# Patient Record
Sex: MALE | Race: NATIVE HAWAIIAN OR OTHER PACIFIC ISLANDER | NOT HISPANIC OR LATINO | URBAN - METROPOLITAN AREA
[De-identification: names, ages, dates, MRNs, and addresses within clinical notes are randomized per-mention and may not be internally consistent; named-entity substitution may affect disease eponyms.]

---

## 2017-06-19 ENCOUNTER — EMERGENCY (EMERGENCY)
Facility: HOSPITAL | Age: 27
LOS: 1 days | Discharge: HOME | End: 2017-06-19
Admitting: EMERGENCY MEDICINE
Payer: MEDICAID

## 2017-06-19 VITALS
SYSTOLIC BLOOD PRESSURE: 125 MMHG | RESPIRATION RATE: 18 BRPM | DIASTOLIC BLOOD PRESSURE: 78 MMHG | TEMPERATURE: 98 F | HEART RATE: 90 BPM | WEIGHT: 163.8 LBS | OXYGEN SATURATION: 97 %

## 2017-06-19 DIAGNOSIS — F17.200 NICOTINE DEPENDENCE, UNSPECIFIED, UNCOMPLICATED: ICD-10-CM

## 2017-06-19 DIAGNOSIS — Z79.899 OTHER LONG TERM (CURRENT) DRUG THERAPY: ICD-10-CM

## 2017-06-19 DIAGNOSIS — R09.81 NASAL CONGESTION: ICD-10-CM

## 2017-06-19 DIAGNOSIS — R10.9 UNSPECIFIED ABDOMINAL PAIN: ICD-10-CM

## 2017-06-19 PROCEDURE — 93010 ELECTROCARDIOGRAM REPORT: CPT

## 2017-06-19 PROCEDURE — 99283 EMERGENCY DEPT VISIT LOW MDM: CPT | Mod: 25

## 2017-06-19 PROCEDURE — 93005 ELECTROCARDIOGRAM TRACING: CPT

## 2017-06-19 RX ORDER — OMEPRAZOLE 10 MG/1
1 CAPSULE, DELAYED RELEASE ORAL
Qty: 0 | Refills: 0 | COMMUNITY

## 2017-06-19 RX ORDER — MOMETASONE FUROATE 50 UG/1
2 SPRAY NASAL
Qty: 1 | Refills: 0 | OUTPATIENT
Start: 2017-06-19 | End: 2017-06-26

## 2017-06-19 NOTE — ED PROVIDER NOTE - OBJECTIVE STATEMENT
The pt is a 25 y/o M, who presents to ED c/o nasal congestion and trouble breathing through nose x few mon.  States congestion is worse at night time.  Hx of GERD on omeprazole, had endoscopy that showed gastritis. Denies fevers, chills, cp, sob, n/v/d

## 2017-06-19 NOTE — ED PROVIDER NOTE - MEDICAL DECISION MAKING DETAILS
pt w/sinus - nasal congestion w/ post nasal drip, no fevers/chills or acute sinusitis, will tx w/nasal spray, f/u w/ent, advised to stop smoking, no gi c/o at this time and has gi f/u in place

## 2017-06-19 NOTE — ED ADULT TRIAGE NOTE - CHIEF COMPLAINT QUOTE
patient complains of midsternal chest pain and throat pain radiating to abdomen for the last few years. he states that he has been diagnosed with GERD no medications now.

## 2017-06-19 NOTE — ED PROVIDER NOTE - ENMT, MLM
Airway patent, + nasal congestion, + post nasal drip, no frontal or maxillary sinus tend b/l . Mouth with normal mucosa. Throat has no vesicles, no oropharyngeal exudates and uvula is midline.

## 2017-06-19 NOTE — ED ADULT NURSE NOTE - OBJECTIVE STATEMENT
presented to ED with pain from throat down to abdomen more than 1 year. seen by several doctors; Dx GERD.  weight lost 8 pounds in 1 month

## 2019-10-29 ENCOUNTER — EMERGENCY (EMERGENCY)
Facility: HOSPITAL | Age: 29
LOS: 1 days | Discharge: HOME | End: 2019-10-29
Attending: EMERGENCY MEDICINE | Admitting: EMERGENCY MEDICINE
Payer: MEDICAID

## 2019-10-29 VITALS
HEART RATE: 73 BPM | TEMPERATURE: 98 F | OXYGEN SATURATION: 97 % | DIASTOLIC BLOOD PRESSURE: 67 MMHG | RESPIRATION RATE: 16 BRPM | SYSTOLIC BLOOD PRESSURE: 149 MMHG

## 2019-10-29 VITALS
DIASTOLIC BLOOD PRESSURE: 80 MMHG | HEART RATE: 90 BPM | TEMPERATURE: 98 F | SYSTOLIC BLOOD PRESSURE: 159 MMHG | RESPIRATION RATE: 16 BRPM | OXYGEN SATURATION: 98 %

## 2019-10-29 LAB
ALBUMIN SERPL ELPH-MCNC: 4.6 G/DL — SIGNIFICANT CHANGE UP (ref 3.3–5)
ALP SERPL-CCNC: 86 U/L — SIGNIFICANT CHANGE UP (ref 40–120)
ALT FLD-CCNC: 22 U/L — SIGNIFICANT CHANGE UP (ref 4–41)
ANION GAP SERPL CALC-SCNC: 11 MMO/L — SIGNIFICANT CHANGE UP (ref 7–14)
AST SERPL-CCNC: 20 U/L — SIGNIFICANT CHANGE UP (ref 4–40)
BASOPHILS # BLD AUTO: 0.04 K/UL — SIGNIFICANT CHANGE UP (ref 0–0.2)
BASOPHILS NFR BLD AUTO: 0.4 % — SIGNIFICANT CHANGE UP (ref 0–2)
BILIRUB SERPL-MCNC: 0.5 MG/DL — SIGNIFICANT CHANGE UP (ref 0.2–1.2)
BUN SERPL-MCNC: 8 MG/DL — SIGNIFICANT CHANGE UP (ref 7–23)
CALCIUM SERPL-MCNC: 9.4 MG/DL — SIGNIFICANT CHANGE UP (ref 8.4–10.5)
CHLORIDE SERPL-SCNC: 101 MMOL/L — SIGNIFICANT CHANGE UP (ref 98–107)
CO2 SERPL-SCNC: 29 MMOL/L — SIGNIFICANT CHANGE UP (ref 22–31)
CREAT SERPL-MCNC: 1.12 MG/DL — SIGNIFICANT CHANGE UP (ref 0.5–1.3)
EOSINOPHIL # BLD AUTO: 0.08 K/UL — SIGNIFICANT CHANGE UP (ref 0–0.5)
EOSINOPHIL NFR BLD AUTO: 0.9 % — SIGNIFICANT CHANGE UP (ref 0–6)
GLUCOSE SERPL-MCNC: 86 MG/DL — SIGNIFICANT CHANGE UP (ref 70–99)
HCT VFR BLD CALC: 42.6 % — SIGNIFICANT CHANGE UP (ref 39–50)
HGB BLD-MCNC: 14.8 G/DL — SIGNIFICANT CHANGE UP (ref 13–17)
IMM GRANULOCYTES NFR BLD AUTO: 0.2 % — SIGNIFICANT CHANGE UP (ref 0–1.5)
LYMPHOCYTES # BLD AUTO: 2.64 K/UL — SIGNIFICANT CHANGE UP (ref 1–3.3)
LYMPHOCYTES # BLD AUTO: 29.5 % — SIGNIFICANT CHANGE UP (ref 13–44)
MCHC RBC-ENTMCNC: 30.1 PG — SIGNIFICANT CHANGE UP (ref 27–34)
MCHC RBC-ENTMCNC: 34.7 % — SIGNIFICANT CHANGE UP (ref 32–36)
MCV RBC AUTO: 86.8 FL — SIGNIFICANT CHANGE UP (ref 80–100)
MONOCYTES # BLD AUTO: 0.76 K/UL — SIGNIFICANT CHANGE UP (ref 0–0.9)
MONOCYTES NFR BLD AUTO: 8.5 % — SIGNIFICANT CHANGE UP (ref 2–14)
NEUTROPHILS # BLD AUTO: 5.4 K/UL — SIGNIFICANT CHANGE UP (ref 1.8–7.4)
NEUTROPHILS NFR BLD AUTO: 60.5 % — SIGNIFICANT CHANGE UP (ref 43–77)
NRBC # FLD: 0 K/UL — SIGNIFICANT CHANGE UP (ref 0–0)
PLATELET # BLD AUTO: 223 K/UL — SIGNIFICANT CHANGE UP (ref 150–400)
PMV BLD: 11.5 FL — SIGNIFICANT CHANGE UP (ref 7–13)
POTASSIUM SERPL-MCNC: 3.7 MMOL/L — SIGNIFICANT CHANGE UP (ref 3.5–5.3)
POTASSIUM SERPL-SCNC: 3.7 MMOL/L — SIGNIFICANT CHANGE UP (ref 3.5–5.3)
PROT SERPL-MCNC: 7.7 G/DL — SIGNIFICANT CHANGE UP (ref 6–8.3)
RBC # BLD: 4.91 M/UL — SIGNIFICANT CHANGE UP (ref 4.2–5.8)
RBC # FLD: 11.9 % — SIGNIFICANT CHANGE UP (ref 10.3–14.5)
SODIUM SERPL-SCNC: 141 MMOL/L — SIGNIFICANT CHANGE UP (ref 135–145)
TROPONIN T, HIGH SENSITIVITY: < 6 NG/L — SIGNIFICANT CHANGE UP (ref ?–14)
WBC # BLD: 8.94 K/UL — SIGNIFICANT CHANGE UP (ref 3.8–10.5)
WBC # FLD AUTO: 8.94 K/UL — SIGNIFICANT CHANGE UP (ref 3.8–10.5)

## 2019-10-29 PROCEDURE — 99284 EMERGENCY DEPT VISIT MOD MDM: CPT

## 2019-10-29 PROCEDURE — 71045 X-RAY EXAM CHEST 1 VIEW: CPT | Mod: 26

## 2019-10-29 RX ORDER — IBUPROFEN 200 MG
400 TABLET ORAL ONCE
Refills: 0 | Status: COMPLETED | OUTPATIENT
Start: 2019-10-29 | End: 2019-10-29

## 2019-10-29 RX ORDER — ACETAMINOPHEN 500 MG
975 TABLET ORAL ONCE
Refills: 0 | Status: COMPLETED | OUTPATIENT
Start: 2019-10-29 | End: 2019-10-29

## 2019-10-29 RX ADMIN — Medication 975 MILLIGRAM(S): at 22:55

## 2019-10-29 RX ADMIN — Medication 400 MILLIGRAM(S): at 22:55

## 2019-10-29 NOTE — ED PROVIDER NOTE - PHYSICAL EXAMINATION
PHYSICAL EXAM:    Constitutional: NAD. well-developed; well-groomed; well-nourished; young male lying comfortably on bed on room air.  HEENT: AT/NC, PERRLA; EOMI, MMM, no cervical lymphadenopathy, supple neck.  Respiratory: CTAB. equal aeration bilaterally. no wheezing, no crackes, no rhonchi. no increase in WOB  Cardiovascular: RRR, no M/R/G. 2+ distal pulses. Cap refill < 2 seconds. no JVD  Gastrointestinal: soft; ND, TTP on lower ribs and RUQ/LUQ. +BS, no peritoneal signs.   Extremities: no clubbing; no cyanosis; no pedal edema, non-tender to palpation, DP and Radial pulses intact.  Skin: warm and dry; color normal: no rash: no ulcers  Neurological: A&Ox 3; responds to pain; responds to verbal commands; epicritic and protopathic sensation intact: CN nerves grossly intact.   MSK/Back: no deformities. Active and passive ROM intact; strength intact, no CVA tenderness, No joint tenderness, swelling, erythema  Psychiatric: normal mood/affect.

## 2019-10-29 NOTE — ED PROVIDER NOTE - PATIENT PORTAL LINK FT
You can access the FollowMyHealth Patient Portal offered by [facility] by registering at the following website: [email or web address]. By joining [facility]’s FollowMyHealth portal, you will also be able to view your health information using other applications (apps) compatible with our system.

## 2019-10-29 NOTE — ED PROVIDER NOTE - NS ED ROS FT
CONSTITUTIONAL: No fever, weight loss, or fatigue  EYES: No eye pain, visual disturbances, or discharge  ENMT:  No difficulty hearing, tinnitus, vertigo; No sinus or throat pain  RESPIRATORY: cough, SOB, no wheezing, chills or hemoptysis;  CARDIOVASCULAR: chest pain, no palpitations, dizziness, or leg swelling  GASTROINTESTINAL: abdominal pain. No nausea, vomiting, or hematemesis; No diarrhea or constipation. No melena or hematochezia.  GENITOURINARY: No dysuria, frequency, hematuria, or incontinence  NEUROLOGICAL: No headaches, loss of strength, numbness, or tremors  SKIN: No itching, burning, rashes, or lesions   LYMPH NODES: No enlarged glands  ENDOCRINE: No heat or cold intolerance; No polydipsia or polyuria  MUSCULOSKELETAL: No joint pain or swelling;   PSYCHIATRIC: Denies depression, anxiety  HEME/LYMPH: No easy bruising, or bleeding gums  ALLERGY AND IMMUNOLOGIC: No hives or eczema

## 2019-10-29 NOTE — ED PROVIDER NOTE - CLINICAL SUMMARY MEDICAL DECISION MAKING FREE TEXT BOX
Sasha: 28 M, no PMH, p/w CP (at lower ribs) and SOB after smoking tobacco and marijuana a lot the last 3 days. Cough. EKG unremarkable. Not likely PNA: no infectious Sx (eg, purulent cough). Not likely PE or other VTE: PERC or Wells low score, EKG no e/o R-heart strain. EKG: LVH. Check CXR for PTX.

## 2019-10-29 NOTE — ED ADULT TRIAGE NOTE - CHIEF COMPLAINT QUOTE
Pt comes in with c/o chest pain and SOB since last night, stating that he has been smoking marijuana/cigarettes in increased amount recently. Pt reporting now he is having discomfort in chest, feeling SOB and came in for evaluation for his symptoms. Pt appears in no acute distress, EKG completed and breathing unlabored in triage.

## 2019-10-29 NOTE — ED PROVIDER NOTE - ATTENDING CONTRIBUTION TO CARE
I performed a face-to-face evaluation of the patient and performed a history and physical examination. I agree with the history and physical examination.    28 M, no PMH, p/w CP (at lower ribs) and SOB after smoking tobacco and marijuana a lot the last 3 days. Cough. EKG unremarkable. Not likely PNA: no infectious Sx (eg, purulent cough). Not likely PE or other VTE: PERC or Wells low score, EKG no e/o R-heart strain. EKG: LVH. Check CXR for PTX.

## 2019-10-29 NOTE — ED ADULT NURSE NOTE - NSIMPLEMENTINTERV_GEN_ALL_ED
Implemented All Universal Safety Interventions:  [place] to call system. Call bell, personal items and telephone within reach. Instruct patient to call for assistance. Room bathroom lighting operational. Non-slip footwear when patient is off stretcher. Physically safe environment: no spills, clutter or unnecessary equipment. Stretcher in lowest position, wheels locked, appropriate side rails in place.

## 2019-10-29 NOTE — ED PROVIDER NOTE - OBJECTIVE STATEMENT
Mr. Mendenhall is a 27 y/o male with no significant PMH, who presented w/ CP and SOB in setting of THC use and smoking.    He has been using THC every other day for past three months without significant adverse reaction. Also for past three days, he had increased substance abuse with smoking cigarette with friends, yet was having incongruent thoughts, which made him to take care of himself. He had a lot of dry coughs, SOB especially due to lower rib pain that is preventing deep breath. Also was feeling like he is running out of breath as well.  He went back to home and slept, hoping his symptom will improve, yet he woke up with same chest pressure, and he decided to come to ED for check up.

## 2019-10-29 NOTE — ED ADULT NURSE NOTE - OBJECTIVE STATEMENT
pt received alert and oriented x3. pt c.o having midsternal chest pain and SOB  starting yesterday s/p smoking marijuana. respirations  equal and unlabored. ans soft and nontender. nsr on cm  . pt declines dizziness, lightheadedness, fevers,chills,n/v/d. Call bell in reach, warm blanket provided, bed in lowest position, side rails up x2,safety maintained. will continue to monitor. md at bedside for eval.

## 2019-10-29 NOTE — ED PROVIDER NOTE - NSFOLLOWUPINSTRUCTIONS_ED_ALL_ED_FT
You were evaluated for chest pain in the emergency room. Blood work was within normal limit. Electrocardiogram showed no acute ischemic changes. Cardiac enzyme was negative, less concerning for heart attack. CXR was clean w/o acute pathologic finding. Please follow up with primary care doctor. Avoid smoking cigarette or using recreational drug for now.

## 2019-10-30 PROBLEM — K21.9 GASTRO-ESOPHAGEAL REFLUX DISEASE WITHOUT ESOPHAGITIS: Chronic | Status: ACTIVE | Noted: 2017-06-19

## 2023-06-15 NOTE — ED ADULT TRIAGE NOTE - HEART RATE (BEATS/MIN)
"6/15/23 at 1633 - This Rn called [facility] again to inquire about a CASK test still pending from original testing.  Spoke with \"marcia\" again, she reports that she has not heard from \"curation\" yet on pending results,  she will reach out again to see if there is an estimate on when these results will come in.  Patient will be coming to [facility] office in the AM.  Nursing staff spent a total of 20 minutes on hold .  Marcia never came back to call to inform of any updates . Will continue to check with [facility] to see if any results have arrived.  Next call on Monday the 19th of June.    "
90
